# Patient Record
Sex: MALE | Race: WHITE | Employment: FULL TIME | URBAN - METROPOLITAN AREA
[De-identification: names, ages, dates, MRNs, and addresses within clinical notes are randomized per-mention and may not be internally consistent; named-entity substitution may affect disease eponyms.]

---

## 2018-01-20 ENCOUNTER — HOSPITAL ENCOUNTER (OUTPATIENT)
Age: 28
Discharge: HOME | End: 2018-01-20
Attending: FAMILY MEDICINE
Payer: COMMERCIAL

## 2018-01-20 VITALS
TEMPERATURE: 99 F | RESPIRATION RATE: 16 BRPM | SYSTOLIC BLOOD PRESSURE: 124 MMHG | OXYGEN SATURATION: 98 % | HEART RATE: 95 BPM | DIASTOLIC BLOOD PRESSURE: 92 MMHG

## 2018-01-20 DIAGNOSIS — B34.9 VIRAL SYNDROME: ICD-10-CM

## 2018-01-20 DIAGNOSIS — R07.0 PAIN IN THROAT: ICD-10-CM

## 2018-01-20 DIAGNOSIS — K52.9 GASTROENTERITIS: Primary | ICD-10-CM

## 2018-01-20 LAB
FLUAV + FLUBV RNA SPEC NAA+PROBE: NEGATIVE
S PYO AG THROAT QL: NEGATIVE

## 2018-01-20 PROCEDURE — 96361 HYDRATE IV INFUSION ADD-ON: CPT

## 2018-01-20 PROCEDURE — 87430 STREP A AG IA: CPT

## 2018-01-20 PROCEDURE — 87631 RESP VIRUS 3-5 TARGETS: CPT | Performed by: FAMILY MEDICINE

## 2018-01-20 PROCEDURE — 99214 OFFICE O/P EST MOD 30 MIN: CPT

## 2018-01-20 PROCEDURE — 96374 THER/PROPH/DIAG INJ IV PUSH: CPT

## 2018-01-20 RX ORDER — ONDANSETRON 2 MG/ML
8 INJECTION INTRAMUSCULAR; INTRAVENOUS ONCE
Status: COMPLETED | OUTPATIENT
Start: 2018-01-20 | End: 2018-01-20

## 2018-01-20 RX ORDER — 0.9 % SODIUM CHLORIDE 0.9 %
1000 INTRAVENOUS SOLUTION INTRAVENOUS ONCE
Status: COMPLETED | OUTPATIENT
Start: 2018-01-20 | End: 2018-01-20

## 2018-01-20 RX ORDER — ONDANSETRON 4 MG/1
4 TABLET, ORALLY DISINTEGRATING ORAL EVERY 6 HOURS PRN
Qty: 6 TABLET | Refills: 0 | Status: SHIPPED | OUTPATIENT
Start: 2018-01-20 | End: 2021-06-24 | Stop reason: ALTCHOICE

## 2018-01-20 NOTE — ED NOTES
Patient tolerated IV fluids well. Feeling much better following zofran administration. He is keeping gatorade and crackers down at this time.

## 2018-01-20 NOTE — ED PROVIDER NOTES
Patient Seen in: [address]    History   Patient presents with:  Sore Throat    Stated Complaint: sore throat ; headache; back pain    HPI    58-year-old male patient presents with 3 days of nausea, vomiting, headaches, membrane, external ear and ear canal normal.   Nose: Mucosal edema and rhinorrhea present. Right sinus exhibits no maxillary sinus tenderness and no frontal sinus tenderness.  Left sinus exhibits no maxillary sinus tenderness and no frontal sinus tenderness warning signs and symptoms of when to go to the ER.        Disposition and Plan     Clinical Impression:  Gastroenteritis  (primary encounter diagnosis)  Viral syndrome  Pain in throat    Disposition:  Discharge  1/20/2018  5:15 pm    Follow-up:  Joel Keating,

## 2018-01-20 NOTE — ED INITIAL ASSESSMENT (HPI)
Patient comes in with sore throat for the past three days with accompanying body aches, headache, vomiting. States he vomited more than 10 times day. Also accompanying diarrhea.

## 2021-06-24 ENCOUNTER — LAB ENCOUNTER (OUTPATIENT)
Dept: LAB | Age: 31
End: 2021-06-24
Attending: PHYSICIAN ASSISTANT
Payer: COMMERCIAL

## 2021-06-24 DIAGNOSIS — Z82.69 FAMILY HISTORY OF SYSTEMIC LUPUS ERYTHEMATOSUS: ICD-10-CM

## 2021-06-24 PROBLEM — F98.8 ADD (ATTENTION DEFICIT DISORDER) WITHOUT HYPERACTIVITY: Status: ACTIVE | Noted: 2021-06-24

## 2021-06-24 PROCEDURE — 86038 ANTINUCLEAR ANTIBODIES: CPT

## 2021-06-24 PROCEDURE — 36415 COLL VENOUS BLD VENIPUNCTURE: CPT

## 2021-06-24 NOTE — PROGRESS NOTES
HPI:   HPI   27year-old male is here in the office stating that he is unable to focus and distracted at work and at school for years, but getting worse for the past month.  Patient states that the symptoms started when he was young but did not take medicat Health  Financial Resource Strain:       Difficulty of Paying Living Expenses:   Food Insecurity:       Worried About [address] in the Last Year:       Ran Out of Food in the Last Year:   Transportation Needs:       Lack of Transportation ([employer] Right eye: No discharge. Left eye: No discharge. Conjunctiva/sclera: Conjunctivae normal.   Cardiovascular:      Rate and Rhythm: Normal rate and regular rhythm.       Heart sounds: Normal heart sounds, S1 normal and S2 normal. No murmur

## 2021-07-06 ENCOUNTER — PATIENT MESSAGE (OUTPATIENT)
Dept: FAMILY MEDICINE CLINIC | Facility: CLINIC | Age: 31
End: 2021-07-06

## 2021-07-28 ENCOUNTER — TELEPHONE (OUTPATIENT)
Dept: FAMILY MEDICINE CLINIC | Facility: CLINIC | Age: 31
End: 2021-07-28

## 2021-07-28 NOTE — TELEPHONE ENCOUNTER
Fax received. Prior authorization needed:    •  amphetamine-dextroamphetamine (ADDERALL) 10 MG Oral Tab, Take 1 tablet (10 mg total) by mouth 2 (two) times daily.  Patient takes 1 tablet PO QAM, then 1 tablet PO Qnoon., Disp: 60 tablet, Rfl: 0    PATIENT ID

## 2021-07-29 NOTE — TELEPHONE ENCOUNTER
Prior authorization for adderall has been initiated through [employer] using keycode: [id number] It takes about 1-5 business days for a decision to come back.

## 2021-07-29 NOTE — TELEPHONE ENCOUNTER
Your request has been approved  CaseId:[id number];Status:Approved; Review Type:Prior Auth; Coverage Start Date:06/29/2021; Coverage End Date:07/29/2022;    Pharmacy has been notified

## 2021-08-26 RX ORDER — DEXTROAMPHETAMINE SACCHARATE, AMPHETAMINE ASPARTATE, DEXTROAMPHETAMINE SULFATE AND AMPHETAMINE SULFATE 2.5; 2.5; 2.5; 2.5 MG/1; MG/1; MG/1; MG/1
10 TABLET ORAL 2 TIMES DAILY
Qty: 60 TABLET | Refills: 0 | Status: SHIPPED | OUTPATIENT
Start: 2021-08-26 | End: 2021-09-25

## 2021-09-27 ENCOUNTER — PATIENT MESSAGE (OUTPATIENT)
Dept: FAMILY MEDICINE CLINIC | Facility: CLINIC | Age: 31
End: 2021-09-27

## 2021-09-27 NOTE — TELEPHONE ENCOUNTER
From: Christy Montoya  To: Maximino Mcgrath PA-C  Sent: 9/27/2021 9:57 AM CDT  Subject: Request a refill    I need a refill on my prescription, please.

## 2021-09-28 RX ORDER — DEXTROAMPHETAMINE SACCHARATE, AMPHETAMINE ASPARTATE, DEXTROAMPHETAMINE SULFATE AND AMPHETAMINE SULFATE 2.5; 2.5; 2.5; 2.5 MG/1; MG/1; MG/1; MG/1
10 TABLET ORAL 2 TIMES DAILY
Qty: 60 TABLET | Refills: 0 | Status: SHIPPED | OUTPATIENT
Start: 2021-11-29 | End: 2021-12-24

## 2021-09-28 RX ORDER — DEXTROAMPHETAMINE SACCHARATE, AMPHETAMINE ASPARTATE, DEXTROAMPHETAMINE SULFATE AND AMPHETAMINE SULFATE 2.5; 2.5; 2.5; 2.5 MG/1; MG/1; MG/1; MG/1
10 TABLET ORAL 2 TIMES DAILY
Qty: 60 TABLET | Refills: 0 | Status: SHIPPED | OUTPATIENT
Start: 2021-10-29 | End: 2021-11-28

## 2021-09-28 RX ORDER — DEXTROAMPHETAMINE SACCHARATE, AMPHETAMINE ASPARTATE, DEXTROAMPHETAMINE SULFATE AND AMPHETAMINE SULFATE 2.5; 2.5; 2.5; 2.5 MG/1; MG/1; MG/1; MG/1
10 TABLET ORAL 2 TIMES DAILY
Qty: 60 TABLET | Refills: 0 | Status: SHIPPED | OUTPATIENT
Start: 2021-09-28 | End: 2021-10-28

## 2021-12-24 ENCOUNTER — LAB ENCOUNTER (OUTPATIENT)
Dept: LAB | Age: 31
End: 2021-12-24
Attending: FAMILY MEDICINE
Payer: COMMERCIAL

## 2021-12-24 ENCOUNTER — OFFICE VISIT (OUTPATIENT)
Dept: FAMILY MEDICINE CLINIC | Facility: CLINIC | Age: 31
End: 2021-12-24
Payer: COMMERCIAL

## 2021-12-24 VITALS
HEART RATE: 87 BPM | DIASTOLIC BLOOD PRESSURE: 77 MMHG | SYSTOLIC BLOOD PRESSURE: 128 MMHG | BODY MASS INDEX: 30.09 KG/M2 | HEIGHT: 73 IN | WEIGHT: 227 LBS

## 2021-12-24 DIAGNOSIS — F90.8 ATTENTION DEFICIT HYPERACTIVITY DISORDER (ADHD), OTHER TYPE: ICD-10-CM

## 2021-12-24 DIAGNOSIS — M25.311 SHOULDER INSTABILITY, RIGHT: Primary | ICD-10-CM

## 2021-12-24 PROCEDURE — 3078F DIAST BP <80 MM HG: CPT | Performed by: FAMILY MEDICINE

## 2021-12-24 PROCEDURE — 3074F SYST BP LT 130 MM HG: CPT | Performed by: FAMILY MEDICINE

## 2021-12-24 PROCEDURE — 3008F BODY MASS INDEX DOCD: CPT | Performed by: FAMILY MEDICINE

## 2021-12-24 PROCEDURE — 80307 DRUG TEST PRSMV CHEM ANLYZR: CPT

## 2021-12-24 PROCEDURE — 99213 OFFICE O/P EST LOW 20 MIN: CPT | Performed by: FAMILY MEDICINE

## 2021-12-24 NOTE — PROGRESS NOTES
Blood pressure 128/77, pulse 87, height 6' 1\" (1.854 m), weight 227 lb (103 kg). Presents today complaining of right shoulder instability. He was a throwing athlete previously. He reports his shoulder pops out when he externally rotates his shoulder.

## 2021-12-31 ENCOUNTER — HOSPITAL ENCOUNTER (OUTPATIENT)
Dept: GENERAL RADIOLOGY | Facility: HOSPITAL | Age: 31
Discharge: HOME | End: 2021-12-31
Attending: FAMILY MEDICINE
Payer: COMMERCIAL

## 2021-12-31 DIAGNOSIS — M25.311 SHOULDER INSTABILITY, RIGHT: ICD-10-CM

## 2021-12-31 PROCEDURE — 73030 X-RAY EXAM OF SHOULDER: CPT | Performed by: FAMILY MEDICINE

## 2022-07-07 ENCOUNTER — HOSPITAL ENCOUNTER (EMERGENCY)
Facility: HOSPITAL | Age: 32
Discharge: HOME | End: 2022-07-07
Attending: EMERGENCY MEDICINE
Payer: COMMERCIAL

## 2022-07-07 VITALS
HEART RATE: 82 BPM | DIASTOLIC BLOOD PRESSURE: 85 MMHG | SYSTOLIC BLOOD PRESSURE: 127 MMHG | RESPIRATION RATE: 20 BRPM | OXYGEN SATURATION: 98 % | TEMPERATURE: 99 F

## 2022-07-07 DIAGNOSIS — H18.891 CORNEAL RUST RING OF RIGHT EYE: Primary | ICD-10-CM

## 2022-07-07 DIAGNOSIS — T15.01XA CORNEAL FOREIGN BODY, RIGHT, INITIAL ENCOUNTER: ICD-10-CM

## 2022-07-07 PROCEDURE — 65222 REMOVE FOREIGN BODY FROM EYE: CPT

## 2022-07-07 PROCEDURE — 99283 EMERGENCY DEPT VISIT LOW MDM: CPT

## 2022-07-07 RX ORDER — TETRACAINE HYDROCHLORIDE 5 MG/ML
1 SOLUTION OPHTHALMIC ONCE
Status: COMPLETED | OUTPATIENT
Start: 2022-07-07 | End: 2022-07-07

## 2022-07-07 RX ORDER — ERYTHROMYCIN 5 MG/G
1 OINTMENT OPHTHALMIC EVERY 6 HOURS
Qty: 1 G | Refills: 0 | Status: SHIPPED | OUTPATIENT
Start: 2022-07-07 | End: 2022-07-14

## 2022-07-08 NOTE — ED INITIAL ASSESSMENT (HPI)
Patient presents to ED with foreign body to right eye. Patient states he was grinding metal when a piece flew into his eye. Redness noted to right eye.  Patient complaining of sensitivity to light

## 2024-03-04 ENCOUNTER — APPOINTMENT (OUTPATIENT)
Dept: GENERAL RADIOLOGY | Age: 34
End: 2024-03-04
Attending: NURSE PRACTITIONER
Payer: COMMERCIAL

## 2024-03-04 ENCOUNTER — HOSPITAL ENCOUNTER (OUTPATIENT)
Age: 34
Discharge: HOME | End: 2024-03-04
Payer: COMMERCIAL

## 2024-03-04 VITALS — TEMPERATURE: 100 F | HEART RATE: 108 BPM | OXYGEN SATURATION: 98 % | RESPIRATION RATE: 20 BRPM

## 2024-03-04 DIAGNOSIS — E86.0 MILD DEHYDRATION: ICD-10-CM

## 2024-03-04 DIAGNOSIS — R68.89 FLU-LIKE SYMPTOMS: Primary | ICD-10-CM

## 2024-03-04 DIAGNOSIS — Z20.822 ENCOUNTER FOR LABORATORY TESTING FOR COVID-19 VIRUS: ICD-10-CM

## 2024-03-04 DIAGNOSIS — R05.1 ACUTE COUGH: ICD-10-CM

## 2024-03-04 LAB
POCT INFLUENZA A: NEGATIVE
POCT INFLUENZA B: NEGATIVE
S PYO AG THROAT QL: NEGATIVE
SARS-COV-2 RNA RESP QL NAA+PROBE: NOT DETECTED

## 2024-03-04 PROCEDURE — 71046 X-RAY EXAM CHEST 2 VIEWS: CPT | Performed by: NURSE PRACTITIONER

## 2024-03-04 RX ORDER — NAPROXEN 500 MG/1
500 TABLET ORAL 2 TIMES DAILY PRN
Qty: 20 TABLET | Refills: 0 | Status: SHIPPED | OUTPATIENT
Start: 2024-03-04 | End: 2024-03-14

## 2024-03-04 RX ORDER — ONDANSETRON 4 MG/1
4 TABLET, ORALLY DISINTEGRATING ORAL EVERY 4 HOURS PRN
Qty: 10 TABLET | Refills: 0 | Status: SHIPPED | OUTPATIENT
Start: 2024-03-04 | End: 2024-03-11

## 2024-03-04 RX ORDER — IBUPROFEN 600 MG/1
600 TABLET ORAL ONCE
Status: COMPLETED | OUTPATIENT
Start: 2024-03-04 | End: 2024-03-04

## 2024-03-04 NOTE — ED INITIAL ASSESSMENT (HPI)
Pt here with fevers, cough, body aches, vomiting and dizziness since Friday. Negative covid at home.

## 2024-03-05 NOTE — ED PROVIDER NOTES
Patient Seen in: [facility]      History     Chief Complaint   Patient presents with    Cough/URI    Fever     Stated Complaint: Fever    Subjective:   HPI  Patient is a 33-year-old male who presents to the immediate care center with concern for fever, cough, congestion, nausea, vomiting.  His symptoms have been intermittent, but persistent last 3 days.  He last vomited yesterday and had felt dizzy at that time.  Since then, though symptoms have resolved.  He has been able to eat and drink without difficulty today, has not had dizziness today.  He denies known illness exposure; has had no chest pain or shortness of air; no abdominal pain.          Objective:   History reviewed. No pertinent past medical history.           Past Surgical History:   Procedure Laterality Date    REMOVAL GALLBLADDER                  Social History     Socioeconomic History    Marital status: Single   Tobacco Use    Smoking status: Never    Smokeless tobacco: Never   Vaping Use    Vaping Use: Never used   Substance and Sexual Activity    Alcohol use: Yes     Comment: socially    Drug use: Never              Review of Systems   Constitutional:  Positive for appetite change, chills and fever.   HENT:  Positive for congestion and sore throat.    Respiratory:  Positive for cough. Negative for shortness of breath.    Cardiovascular:  Negative for chest pain.   Gastrointestinal:  Positive for nausea and vomiting. Negative for abdominal pain.   Musculoskeletal:  Positive for myalgias. Negative for arthralgias.   Skin:  Negative for rash.   Neurological:  Positive for dizziness. Negative for weakness and headaches.       Positive for stated complaint: Fever  Other systems are as noted in HPI.  Constitutional and vital signs reviewed.      All other systems reviewed and negative except as noted above.    Physical Exam     ED Triage Vitals [03/04/24 1606]   BP    Pulse (!) 125   Resp 20   Temp (!) 102.2 °F (39 °C)   Temp src Oral    SpO2 98 %   O2 Device None (Room air)       Current:Pulse 108   Temp 99.9 °F (37.7 °C) (Oral)   Resp 20   SpO2 98%         Physical Exam  Vitals and nursing note reviewed.   Constitutional:       General: He is not in acute distress.     Appearance: He is ill-appearing.   HENT:      Right Ear: Tympanic membrane, ear canal and external ear normal.      Left Ear: Tympanic membrane, ear canal and external ear normal.      Nose: Nose normal.      Mouth/Throat:      Mouth: Mucous membranes are moist.      Tonsils: 1+ on the right. 1+ on the left.   Eyes:      Conjunctiva/sclera: Conjunctivae normal.   Cardiovascular:      Rate and Rhythm: Regular rhythm.   Pulmonary:      Effort: Pulmonary effort is normal. No respiratory distress.      Breath sounds: Normal breath sounds.   Musculoskeletal:      Cervical back: Normal range of motion and neck supple.   Skin:     General: Skin is warm and dry.   Neurological:      Mental Status: He is alert and oriented to person, place, and time.   Psychiatric:         Behavior: Behavior normal.               ED Course     Results for orders placed or performed during the hospital encounter of 03/04/24   POCT Flu Test    Collection Time: 03/04/24  4:11 PM    Specimen: Nares; Other   Result Value Ref Range    POCT INFLUENZA A Negative Negative    POCT INFLUENZA B Negative Negative   Rapid SARS-CoV-2 by PCR    Collection Time: 03/04/24  4:35 PM    Specimen: Nares; Other   Result Value Ref Range    Rapid SARS-CoV-2 by PCR Not Detected Not Detected   POCT Rapid Strep    Collection Time: 03/04/24  5:02 PM   Result Value Ref Range    POCT Rapid Strep Negative Negative     XR CHEST PA + LAT CHEST (CPT=71046)   Final Result               =====   PROCEDURE: XR CHEST PA + LAT CHEST (CPT=71046)       COMPARISON: None.       INDICATIONS: Cough and shortness of breath x 3 days.       TECHNIQUE:   Two views.         Findings and impression:  Normal heart size, clear lungs, normal pleura                  Dictated by (CST): Terry Amezquita MD on 3/04/2024 at 5:03 PM        Finalized by (CST): Terry Amezquita MD on 3/04/2024 at 5:04 PM                 Medications   ibuprofen (Motrin) tab 600 mg (600 mg Oral Given 3/4/24 1623)                        MDM      Given prescriptions for Zofran and naproxen, patient states he will go home and push oral fluids to rehydrate.  He will follow-up with primary care provider next week if symptoms continue, the emergency department sooner if symptoms worsen.                                 Medical Decision Making  Diff dx: Viral vs bacterial pharyngitis reviewed with patient, peritonsillar abscess considered.    Differential diagnoses considered included, but are not exclusive of: bacterial vs viral sinusitis, dehydration, pneumonia, influenza, Covid-19 infection, and other viral upper respiratory infection.       Problems Addressed:  Acute cough: self-limited or minor problem  Flu-like symptoms: self-limited or minor problem  Mild dehydration: self-limited or minor problem    Amount and/or Complexity of Data Reviewed  Labs:  Decision-making details documented in ED Course.  Radiology:  Decision-making details documented in ED Course.    Risk  OTC drugs.  Prescription drug management.        Disposition and Plan     Clinical Impression:  1. Flu-like symptoms    2. Encounter for laboratory testing for COVID-19 virus    3. Acute cough    4. Mild dehydration         Disposition:  Discharge  3/4/2024  5:19 pm    Follow-up:  Alvarez Julian, DO  [address] [place] [address]  [phone]    Schedule an appointment as soon as possible for a visit in 1 week  As needed    [facility] Emergency Department  [address] [facility] [address]  [phone]  Go to   If symptoms worsen          Medications Prescribed:  Discharge Medication List as of 3/4/2024  5:22 PM        START taking these medications    Details   ondansetron 4 MG Oral Tablet Dispersible  Take 1 tablet (4 mg total) by mouth every 4 (four) hours as needed for Nausea., Normal, Disp-10 tablet, R-0      naproxen 500 MG Oral Tab Take 1 tablet (500 mg total) by mouth 2 (two) times daily as needed., Normal, Disp-20 tablet, R-0

## 2024-09-25 ENCOUNTER — TELEPHONE (OUTPATIENT)
Dept: ORTHOPEDICS CLINIC | Facility: CLINIC | Age: 34
End: 2024-09-25

## 2024-09-25 DIAGNOSIS — Z01.89 ENCOUNTER FOR LOWER EXTREMITY COMPARISON IMAGING STUDY: ICD-10-CM

## 2024-09-25 DIAGNOSIS — M25.562 LEFT KNEE PAIN, UNSPECIFIED CHRONICITY: Primary | ICD-10-CM

## 2024-09-25 NOTE — TELEPHONE ENCOUNTER
XR ordered per ortho protocol. XR scheduled and patient was notified via [employer] to let them know that they should arrive 15-20 minutes early, in order for them to complete imaging.

## 2024-09-25 NOTE — TELEPHONE ENCOUNTER
Future Appointments   Date Time Provider Department Center   9/30/2024  2:40 PM Julisa Larson, F[facility]     This patient is coming for LT Knee pain. No prior imaging done yet. Please advise if views are needed for this appt. Thanks.    Patient may be reached at [phone]

## 2024-10-07 ENCOUNTER — OFFICE VISIT (OUTPATIENT)
Dept: ORTHOPEDICS CLINIC | Facility: CLINIC | Age: 34
End: 2024-10-07

## 2024-10-07 VITALS — WEIGHT: 227 LBS | HEIGHT: 73 IN | BODY MASS INDEX: 30.09 KG/M2

## 2024-10-07 DIAGNOSIS — M25.562 ACUTE PAIN OF LEFT KNEE: Primary | ICD-10-CM

## 2024-10-07 PROCEDURE — 99204 OFFICE O/P NEW MOD 45 MIN: CPT | Performed by: PHYSICIAN ASSISTANT

## 2024-10-07 NOTE — PROGRESS NOTES
[facility] New Patient Note    CC: Left knee injury DOI 09/09/2024    HPI: This 34 year old male presents today with complaints of left knee injury.  This occurred about 1 month ago at work when he slipped on an uneven surface and twisted the knee.   He was seen and evaluated at [facility] care at which time x-rays were completed about 2 weeks ago.  The images and report are available for me to review today.  He states overall symptoms are unchanged.  Pain is localized to the lateral and anterior aspect of the knee.  It is worse with certain stepping and twisting motions.  He notes associated swelling.  He has tried doing physical therapy and some exercises on his own at home as well as taking ibuprofen and Tylenol with no resolution in symptoms.  He is here for further evaluation.    History reviewed. No pertinent past medical history.  Past Surgical History:   Procedure Laterality Date    Removal gallbladder       No current outpatient medications on file.     No Known Allergies  Family History   Problem Relation Age of Onset    Diabetes Father     Hypertension Father     Hypertension Mother     Other (Other) Sister      Social History     Occupational History    Not on file   Tobacco Use    Smoking status: Never    Smokeless tobacco: Never   Vaping Use    Vaping status: Never Used   Substance and Sexual Activity    Alcohol use: Yes     Comment: socially    Drug use: Never    Sexual activity: Not on file        ROS:  Complete review of symptoms was reviewed and is non-contributory to the chief complaint as mentioned above.      Physical Exam: He is a pleasant 34-year-old male in no acute distress.  He ambulates with a mildly antalgic gait.  Exam of the left knee and lower extremity reveals that the overlying skin is intact.  He has a trace palpable effusion.  He has apprehension with full extension and pain with full flexion.  He is nontender to palpation over the medial joint line but  has significant tenderness over the lateral joint line.  No patellofemoral crepitus with range of motion.  He has a positive Steinmann and Maryam.  Sensation is present to light touch.  Stable ligamentous exam with no instability with varus and valgus stress and negative Lachman.        Imaging: X-rays completed on 9/27/2024 from [facility] immediate care was independently read and radiologically reviewed.  It shows no acute abnormalities.          Assessment: Left knee pain, possible lateral meniscus tear      Plan: I discussed x-ray and exam findings with the patient and the  today.  On physical exam he does have indications of a possible lateral meniscus tear and I recommend further evaluation with an MRI scan of the left knee to evaluate for pathology that may require surgical intervention.  I explained that unfortunately he has not had significant improvement with conservative treatment including oral anti-inflammatories, Tylenol and exercises and physical therapy.  He will continue with ice, elevation, compression and ibuprofen as needed.  A work note stating he should avoid twisting, squatting, stooping, ladders, and walking to tolerance was given.  I recommend follow-up with me once the MRI is available to review to discuss next steps.  He will follow-up sooner with worsening symptoms in the interim.        Julisa Larson PA-C  Orthopedic Surgery   [address] [place] [address]   t: [phone]  f: [phone]           This document was partially prepared using Dragon Medical voice recognition software.  Although every attempt is made to correct errors during dictation, discrepancies may still exist. Please contact me with any questions or clarifications.

## 2024-10-28 ENCOUNTER — OFFICE VISIT (OUTPATIENT)
Dept: ORTHOPEDICS CLINIC | Facility: CLINIC | Age: 34
End: 2024-10-28
Payer: OTHER MISCELLANEOUS

## 2024-10-28 VITALS — BODY MASS INDEX: 30.09 KG/M2 | HEIGHT: 73 IN | WEIGHT: 227 LBS

## 2024-10-28 DIAGNOSIS — T14.8XXA CONTUSION OF BONE: Primary | ICD-10-CM

## 2024-10-28 DIAGNOSIS — S83.512A SPRAIN OF ANTERIOR CRUCIATE LIGAMENT OF LEFT KNEE, INITIAL ENCOUNTER: ICD-10-CM

## 2024-10-28 DIAGNOSIS — S83.422A SPRAIN OF LATERAL COLLATERAL LIGAMENT OF LEFT KNEE, INITIAL ENCOUNTER: ICD-10-CM

## 2024-10-28 NOTE — PROGRESS NOTES
[facility] Progress Note      Chief Complaint:  Left knee pain, DOI 09/09/2024      Subjective: Crispin Spain is a 34 year old male who is here for follow-up of his left knee injury.  This occurred approximately 2 months ago when he slipped on an uneven surface at work and twisted the knee.  He was initially seen at an immediate care and x-rays were completed.  He was then seen and evaluated by me and I advised going ahead with an MRI scan to evaluate for a lateral meniscus tear.  MRI was completed at an outside facility which is available for me to review today.  He states overall his pain has significantly improved.  He rates the pain a 2 out of 10 and is localized to the lateral aspect of the knee.  It is worse with weightbearing activities.  He has been working with restrictions including no squatting, ladders and walking to tolerance.      Objective: Exam of the left knee and lower extremity reveals that there is no palpable effusion.  He has full extension and full flexion without pain.  He has mild medial joint line tenderness.  No lateral joint line tenderness.  No tenderness over the lateral femoral condyle.  Stable varus stress without instability or pain.  He has a stable Lachman with possible 1+ laxity.  Sensation is present to light touch.    Imaging: MRI of the left knee completed through [facility] on 10/18/2024 was radiologically read.  It shows moderate edema in the lateral femoral condyle with mild LCL and ACL sprain.  There is possible partial-thickness tearing versus fraying of the posterior root insertion of the medial meniscus with small joint effusion.      Assessment: Left knee contusion with ACL and LCL sprain      Plan: I reviewed MRI scan findings with the patient today.  Unfortunately I am unable to see the images as they are not uploaded into our computer but I am able to read the radiological report.  I explained that there are no significant meniscal tearing and his pain is  most likely from a deep bone contusion to the lateral femoral condyle.  He also has mild sprains of the lateral collateral ligament and ACL.  I recommend continued conservative treatment including oral anti-inflammatories as needed, stretching on his own and bracing to protect the ligaments.  He does have a brace at home and he will wear this especially at work.  He will continue to work with restrictions including walking to tolerance, no ladders stooping or squatting.  He will follow-up with me in 1 month for recheck to see how he is progressing in hopes of full resolution of symptoms.  He will follow-up sooner with any other questions or concerns in the interim.        Julisa Larson PA-C  Orthopedic Surgery   [address] [place] [address]   t: [phone]  f: [phone]           This document was partially prepared using Dragon Medical voice recognition software.  Although every attempt is made to correct errors during dictation, discrepancies may still exist. Please contact me with any questions or clarifications.

## 2024-11-19 ENCOUNTER — HOSPITAL ENCOUNTER (EMERGENCY)
Facility: HOSPITAL | Age: 34
Discharge: HOME | End: 2024-11-19
Attending: EMERGENCY MEDICINE
Payer: OTHER MISCELLANEOUS

## 2024-11-19 VITALS
WEIGHT: 240 LBS | TEMPERATURE: 98 F | SYSTOLIC BLOOD PRESSURE: 119 MMHG | HEART RATE: 81 BPM | OXYGEN SATURATION: 96 % | DIASTOLIC BLOOD PRESSURE: 90 MMHG | RESPIRATION RATE: 16 BRPM | BODY MASS INDEX: 31.81 KG/M2 | HEIGHT: 73 IN

## 2024-11-19 DIAGNOSIS — S61.214A LACERATION OF RIGHT RING FINGER WITHOUT FOREIGN BODY WITHOUT DAMAGE TO NAIL, INITIAL ENCOUNTER: Primary | ICD-10-CM

## 2024-11-19 PROCEDURE — 90471 IMMUNIZATION ADMIN: CPT

## 2024-11-19 PROCEDURE — 99283 EMERGENCY DEPT VISIT LOW MDM: CPT

## 2024-11-19 RX ORDER — CEPHALEXIN 500 MG/1
500 CAPSULE ORAL 4 TIMES DAILY
Qty: 28 CAPSULE | Refills: 0 | Status: SHIPPED | OUTPATIENT
Start: 2024-11-19 | End: 2024-11-26

## 2024-11-19 NOTE — ED INITIAL ASSESSMENT (HPI)
Pt arrives through triage with       complaints of right fourth finger lac/abrasion after his finger getting caught on a wire wheel. Bleeding controlled in triage.    Pt unsure of last tetanus     Pt took 600 mg ibuprofen PTA

## 2024-11-20 NOTE — ED PROVIDER NOTES
Patient Seen in: [facility] Emergency Department      History     Chief Complaint   Patient presents with    Laceration/Abrasion     Stated Complaint: rt 4th digit injury - workers comp    Subjective:   HPI          Objective:     History reviewed. No pertinent past medical history.           Past Surgical History:   Procedure Laterality Date    Removal gallbladder                  Social History     Socioeconomic History    Marital status: Single   Tobacco Use    Smoking status: Never    Smokeless tobacco: Never   Vaping Use    Vaping status: Never Used   Substance and Sexual Activity    Alcohol use: Yes     Comment: socially    Drug use: Never                  Physical Exam     ED Triage Vitals [11/19/24 1542]   BP (!) 139/97   Pulse 79   Resp 18   Temp 98.2 °F (36.8 °C)   Temp src Oral   SpO2 94 %   O2 Device None (Room air)       Current Vitals:   Vital Signs  BP: 119/90  Pulse: 81  Resp: 16  Temp: 98.2 °F (36.8 °C)  Temp src: Oral  MAP (mmHg): (!) 111    Oxygen Therapy  SpO2: 96 %  O2 Device: None (Room air)        Physical Exam        ED Course   Labs Reviewed - No data to display                MDM      34-year-old male without significant past medical history presents with an injury to his right fourth finger.  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  He was wearing gloves at the time.  Denies other injuries.  Unknown last tetanus.    On exam, right fourth finger with macerated skin avulsion on the medial side that does not appear to affect the nailbed.  No evidence foreign body.  Sensation intact distally.  Extension flexion intact.    Differential: Laceration/skin avulsion of the low right for right fourth finger.    This wound is not amenable to closure with sutures.  I would recommend healing by secondary intention.  Patient advised on wound care, given a prescription for prophylactic antibiotics, instructed on follow-up, and given return  precautions.        Medical Decision Making      Disposition and Plan     Clinical Impression:  1. Laceration of right ring finger without foreign body without damage to nail, initial encounter         Disposition:  Discharge  11/19/2024  4:45 pm    Follow-up:  Alvarez Julian, DO  [address] [place] [address]  [phone]    Follow up in 1 week(s)  For wound re-check          Medications Prescribed:  Discharge Medication List as of 11/19/2024  4:53 PM        START taking these medications    Details   cephALEXin 500 MG Oral Cap Take 1 capsule (500 mg total) by mouth 4 (four) times daily for 7 days., Normal, Disp-28 capsule, R-0                 Supplementary Documentation:

## 2024-11-26 ENCOUNTER — OFFICE VISIT (OUTPATIENT)
Dept: FAMILY MEDICINE CLINIC | Facility: CLINIC | Age: 34
End: 2024-11-26

## 2024-11-26 ENCOUNTER — HOSPITAL ENCOUNTER (OUTPATIENT)
Dept: GENERAL RADIOLOGY | Age: 34
Discharge: HOME | End: 2024-11-26
Attending: FAMILY MEDICINE
Payer: OTHER MISCELLANEOUS

## 2024-11-26 VITALS
RESPIRATION RATE: 17 BRPM | HEIGHT: 73 IN | BODY MASS INDEX: 33.13 KG/M2 | SYSTOLIC BLOOD PRESSURE: 133 MMHG | HEART RATE: 76 BPM | WEIGHT: 250 LBS | DIASTOLIC BLOOD PRESSURE: 82 MMHG

## 2024-11-26 DIAGNOSIS — S69.91XD INJURY OF RIGHT RING FINGER, SUBSEQUENT ENCOUNTER: ICD-10-CM

## 2024-11-26 DIAGNOSIS — S69.91XD INJURY OF RIGHT RING FINGER, SUBSEQUENT ENCOUNTER: Primary | ICD-10-CM

## 2024-11-26 PROCEDURE — 99213 OFFICE O/P EST LOW 20 MIN: CPT | Performed by: FAMILY MEDICINE

## 2024-11-26 PROCEDURE — 73140 X-RAY EXAM OF FINGER(S): CPT | Performed by: FAMILY MEDICINE

## 2024-11-26 NOTE — PROGRESS NOTES
Blood pressure 133/82, pulse 76, resp. rate 17, height 6' 1\" (1.854 m), weight 250 lb (113.4 kg).      Status post right ring finger injury 1 week ago.  Caught it in a .  Skin avulsion was seen no x-ray taken.  Patient was told to put bacitracin on IT TO  keep it dressed.    No x-rays were taken.    Objective right ring finger with skin avulsion noted no surrounding erythema no discharge    Assessment avulsion of the skin of the right ring finger    Plan x-ray ordered follow-up with hand specialty    Dressing placed in office by medical assistant

## 2024-12-09 ENCOUNTER — TELEPHONE (OUTPATIENT)
Dept: ORTHOPEDICS CLINIC | Facility: CLINIC | Age: 34
End: 2024-12-09

## 2024-12-09 ENCOUNTER — OFFICE VISIT (OUTPATIENT)
Dept: ORTHOPEDICS CLINIC | Facility: CLINIC | Age: 34
End: 2024-12-09
Payer: OTHER MISCELLANEOUS

## 2024-12-09 DIAGNOSIS — S83.422D SPRAIN OF LATERAL COLLATERAL LIGAMENT OF LEFT KNEE, SUBSEQUENT ENCOUNTER: ICD-10-CM

## 2024-12-09 DIAGNOSIS — S83.512D SPRAIN OF ANTERIOR CRUCIATE LIGAMENT OF LEFT KNEE, SUBSEQUENT ENCOUNTER: ICD-10-CM

## 2024-12-09 DIAGNOSIS — T14.8XXA CONTUSION OF BONE: Primary | ICD-10-CM

## 2024-12-09 PROCEDURE — 99213 OFFICE O/P EST LOW 20 MIN: CPT | Performed by: PHYSICIAN ASSISTANT

## 2024-12-09 NOTE — PROGRESS NOTES
[facility] Progress Note      Chief Complaint:  Left knee pain, DOI 09/09/2024      Subjective: Crispin Spain is a 34 year old male who is here for follow-up of his left knee injury.  This occurred approximately 3 months ago when he slipped on an uneven surface at work and twisted the knee.  MRI from [facility] on 10/18/2024 showed lateral femoral condyle contusion and LCL and ACL sprain.  He has returned to work with restrictions and has been wearing a brace.  He notes significant improvement in symptoms with minimal pain.  He does note some discomfort with going up and down stairs but no other pain or instability.        Objective: Exam of the left knee and lower extremity reveals that there is no effusion.  He has full extension and full flexion without pain.  Mild medial joint line tenderness.  Mild lateral joint line tenderness.  Mild tenderness over the lateral femoral condyle.  Sensation is present to light touch.  Stable ligamentous exam with negative Lachman and valgus stress.      Assessment: Left knee contusion with ACL and LCL sprain      Plan: Overall he is doing very well from his injury that occurred 3 months ago.  He has normalizing range of motion and no instability and has been working well with no restrictions.  At this time it would be reasonable to go ahead with a trial of full duty.  He will wear his brace that he has and will follow-up with me in 1 month.  At that time if he continues to have no pain or instability with full duty, we will release him with MMI.  A work note was given.  He will continue with quad stretching and oral anti-inflammatories as needed.  He will follow-up with me as mentioned or sooner with questions, concerns, or worsening symptoms.        Julisa Larson PA-C  Orthopedic Surgery   [address] [place] [address]   t: [phone]  f: [phone]           This document was partially prepared using Dragon Medical voice recognition software.   Although every attempt is made to correct errors during dictation, discrepancies may still exist. Please contact me with any questions or clarifications.

## 2024-12-09 NOTE — TELEPHONE ENCOUNTER
Patient is scheduled for right ring finger injury. X-rays in Epic from 11/26. Please advise if imaging is needed.  Future Appointments   Date Time Provider Department Center   12/18/2024  8:40 AM Jess Hein PA [facility]   1/6/2025  8:00 AM Julisa Larson PA-C [facility]

## 2024-12-18 ENCOUNTER — OFFICE VISIT (OUTPATIENT)
Dept: ORTHOPEDICS CLINIC | Facility: CLINIC | Age: 34
End: 2024-12-18

## 2024-12-18 VITALS — BODY MASS INDEX: 33.13 KG/M2 | WEIGHT: 250 LBS | HEIGHT: 73 IN

## 2024-12-18 DIAGNOSIS — S61.214A LACERATION OF RIGHT RING FINGER WITHOUT FOREIGN BODY WITHOUT DAMAGE TO NAIL, INITIAL ENCOUNTER: Primary | ICD-10-CM

## 2024-12-18 NOTE — H&P
Clinic Note [facility]     Assessment/Plan:  34 year old male    Right ring finger distal tip puncture injury DOI 11/19/2024-it is healing over nicely and he will continue with dressing changes.  He can continue working and self restricting.  He will keep the area clean.  We discussed wound care.    No diagnosis found.     Follow Up: 4 weeks but can cancel if better    Diagnostic Studies:  X-rays are negative for acute fractures occasional deformity    Physical Exam:    Ht 6' 1\" (1.854 m)   Wt 250 lb (113.4 kg)   BMI 32.98 kg/m²     Constitutional: NAD. AOx3. Well-developed and Well-nourished.   Psychiatric: Normal mood/ affect/ behavior. Judgment and thought content normal.     Right upper Extremity:   Inspection: Skin Intact. No skin lesions. No gross deformity.   Palpation: Mildly tender and sensitive along the distal tip   Motion: Elbow: normal bilateral symmetric ext/flex  Wrist: normal bilateral symmetric ext/flex/sup/pro  Finger: full composite fist, tendons intact.       CC: Hand Injury (RT RING FINGER; CUT WITH  AT WORK; DOI: 11/19/24)        HPI: This 34 year old male presents with complaints of pain to his right ring finger.  He cut it with a  at work 11/19/2024.  Has been working on dressing changes and he still been working full duty.  He was given antibiotics for a week early on.  His pain is overall improving.      No past medical history on file.    Past Surgical History:   Procedure Laterality Date    Removal gallbladder         No current outpatient medications on file.       Allergies[1]    Family History   Problem Relation Age of Onset    Diabetes Father     Hypertension Father     Hypertension Mother     Other (Other) Sister        Social History     Occupational History    Not on file   Tobacco Use    Smoking status: Never    Smokeless tobacco: Never   Vaping Use    Vaping status: Never Used   Substance and Sexual Activity    Alcohol use: Yes     Comment: socially    Drug  use: Never    Sexual activity: Not on file        Review of Systems (negative unless bolded):  General: fevers, chills, fatigue  CV:  chest pain, palpitations, leg swelling  Msk: bodyaches, neck pain, neck stiffness  Skin: rashes, open wounds, nonhealing ulcers  Hem: bleeds easily, bruise easily, immunocompromised  Neuro: dizziness, light headedness, headaches  Psych: anxious, depressed, anger issues  Jess Hein PA-C  Hand, Wrist, & Elbow Surgery  Physician Assistant to [email or web address]. [email or web address]  t: [phone]  f: [phone]         [1] No Known Allergies

## 2025-01-06 ENCOUNTER — OFFICE VISIT (OUTPATIENT)
Dept: ORTHOPEDICS CLINIC | Facility: CLINIC | Age: 35
End: 2025-01-06
Payer: OTHER MISCELLANEOUS

## 2025-01-06 VITALS — HEIGHT: 73 IN | BODY MASS INDEX: 33.13 KG/M2 | WEIGHT: 250 LBS

## 2025-01-06 DIAGNOSIS — S83.422D SPRAIN OF LATERAL COLLATERAL LIGAMENT OF LEFT KNEE, SUBSEQUENT ENCOUNTER: Primary | ICD-10-CM

## 2025-01-06 DIAGNOSIS — S83.512D SPRAIN OF ANTERIOR CRUCIATE LIGAMENT OF LEFT KNEE, SUBSEQUENT ENCOUNTER: ICD-10-CM

## 2025-01-06 NOTE — PROGRESS NOTES
[facility] Progress Note      Chief Complaint:  Left knee pain, DOI 09/09/2024      Subjective: Crispin Spain is a 34 year old male who is here for follow-up of his left knee.  He injured the left knee approximately 4 months ago when he slipped on an uneven surface at work and twisted the knee.  MRI was completed through [facility] and he was diagnosed with a lateral femoral condyle contusion and LCL and ACL sprain.  He has returned to work without restriction and notes no significant worsening of pain.  He did have 1 day with increased discomfort after walking through mud but this resolved quickly.  He has returned to the gym and notes no weakness or instability.      Objective: Exam of the left knee and lower extremity reveals there is no effusion.  He has full extension and symmetric flexion.  No medial or lateral joint line tenderness.  No tenderness over the lateral femoral condyle.  No pain or instability with varus and valgus stress.  Negative Lachman.  Sensation is present to light touch.      Assessment: Left knee contusion with ACL and LCL sprain      Plan: Overall he is doing very well with normal range of motion and function.  At this time he has reached medical maximal improvement and he will continue to work without restrictions.  He will continue to slowly advance with activities as tolerated outside of work.  A work note was given.  He will follow-up with me on an as-needed basis with any worsening symptoms, questions or concerns.        Julisa Larson PA-C  Orthopedic Surgery   [address] [place] [id number]   t: [phone]  f: [phone]           This document was partially prepared using Dragon Medical voice recognition software.  Although every attempt is made to correct errors during dictation, discrepancies may still exist. Please contact me with any questions or clarifications.

## 2025-02-03 ENCOUNTER — TELEPHONE (OUTPATIENT)
Dept: ORTHOPEDICS CLINIC | Facility: CLINIC | Age: 35
End: 2025-02-03

## 2025-02-03 NOTE — TELEPHONE ENCOUNTER
Request received for 10/7/24, 10/28/24, 12/9/24, 1/6/24.     Faxed notes to workers comp  at [phone].     Received fax confirmation.

## (undated) NOTE — LETTER
Date: 10/28/2024    Patient Name: Crispin Spain          To Whom it may concern:    The above patient was seen at [facility] for treatment of a medical condition and is currently under my medical care.        The patient may return to work with the following restrictions, must wear brace, walking to tolerance, no squatting, no twisting, and no use of ladders. These restrictions will stay in place until his next appointment for the next 4 weeks which at that time he will follow up and be re-evaluated at his next appointment. If you have any questions please contact our office at [phone].    Sincerely,    Julisa Larson PA-C

## (undated) NOTE — LETTER
Date: 1/6/2025    Patient Name: Crispin Spain          To Whom it may concern:    The above patient was seen at [facility] for treatment of a medical condition and is currently under my medical care.    This patient was seen in office today 01/06/2025.    The patient may return to work full duty with no restrictions. He will follow up as needed, if you have any questions please contact our office at [phone].      Sincerely,    Julisa Larson PA-C

## (undated) NOTE — LETTER
Date: 12/9/2024    Patient Name: Crispin Spain          To Whom it may concern:    The above patient was seen at [facility] for treatment of a medical condition and is currently under my medical care.    This patient was seen in office today 12/09/2024.    The patient may return to work full duty with no restrictions. If you have any questions please contact our office at [phone].        Sincerely,    Julisa Larson PA-C

## (undated) NOTE — LETTER
Date: 12/18/2024    Patient Name: Crispin Spain          To Whom it may concern:    The above patient was seen at [facility] for treatment of a medical condition.    This patient can continue working but no use of the left arm other than for very light activities like .       Sincerely,    HERIBERTO June

## (undated) NOTE — LETTER
Date: 12/18/2024    Patient Name: Crispin Spain          To Whom it may concern:     The above patient was seen at [facility] for treatment of a medical condition.    This patient can continue working but should continue to keep the finger clean.    Sincerely,    HERIBERTO June

## (undated) NOTE — LETTER
Date: 10/28/2024    Patient Name: Crispin Spain          To Whom it may concern:    The above patient was seen at [facility] for treatment of a medical condition and is currently under my medical care.        The patient may return to work with the following restrictions, must wear brace, walking to tolerance, no squatting, no twisting, and no use of ladders. These restrictions will stay in place until his next appointment once MRI has been completed where he will be re-evaluated at that time. If you have any questions please contact our office at [phone].      Sincerely,    Julisa Larson PA-C

## (undated) NOTE — LETTER
Date: 10/7/2024    Patient Name: Crispin Spain          To Whom it may concern:    The above patient was seen at [facility] for treatment of a medical condition and is currently under my medical care.      The patient may return to work with the following restrictions, walking to tolerance, no squatting, no twisting, and no use of ladders. These restrictions will stay in place until his next appointment once MRI has been completed where he will be re-evaluated at that time. If you have any questions please contact our office at [phone].        Sincerely,    Julisa Larson PA-C